# Patient Record
Sex: MALE | Race: WHITE | NOT HISPANIC OR LATINO
[De-identification: names, ages, dates, MRNs, and addresses within clinical notes are randomized per-mention and may not be internally consistent; named-entity substitution may affect disease eponyms.]

---

## 2018-03-07 ENCOUNTER — APPOINTMENT (OUTPATIENT)
Dept: UROLOGY | Facility: CLINIC | Age: 58
End: 2018-03-07
Payer: COMMERCIAL

## 2018-03-07 VITALS
HEART RATE: 53 BPM | SYSTOLIC BLOOD PRESSURE: 128 MMHG | DIASTOLIC BLOOD PRESSURE: 76 MMHG | WEIGHT: 160 LBS | BODY MASS INDEX: 23.7 KG/M2 | HEIGHT: 69 IN

## 2018-03-07 DIAGNOSIS — Z80.0 FAMILY HISTORY OF MALIGNANT NEOPLASM OF DIGESTIVE ORGANS: ICD-10-CM

## 2018-03-07 DIAGNOSIS — Z82.49 FAMILY HISTORY OF ISCHEMIC HEART DISEASE AND OTHER DISEASES OF THE CIRCULATORY SYSTEM: ICD-10-CM

## 2018-03-07 DIAGNOSIS — Z78.9 OTHER SPECIFIED HEALTH STATUS: ICD-10-CM

## 2018-03-07 DIAGNOSIS — I10 ESSENTIAL (PRIMARY) HYPERTENSION: ICD-10-CM

## 2018-03-07 LAB
BILIRUB UR QL STRIP: NORMAL
CLARITY UR: CLEAR
COLLECTION METHOD: NORMAL
GLUCOSE UR-MCNC: NORMAL
HCG UR QL: NORMAL EU/DL
HGB UR QL STRIP.AUTO: NORMAL
KETONES UR-MCNC: NORMAL
LEUKOCYTE ESTERASE UR QL STRIP: NORMAL
NITRITE UR QL STRIP: NORMAL
PH UR STRIP: 5
PROT UR STRIP-MCNC: NORMAL
SP GR UR STRIP: 1.02

## 2018-03-07 PROCEDURE — 81003 URINALYSIS AUTO W/O SCOPE: CPT | Mod: QW

## 2018-03-07 PROCEDURE — 99213 OFFICE O/P EST LOW 20 MIN: CPT | Mod: 25

## 2018-03-07 RX ORDER — FINASTERIDE 5 MG/1
5 TABLET, FILM COATED ORAL
Refills: 0 | Status: ACTIVE | COMMUNITY

## 2018-03-07 RX ORDER — ATENOLOL 25 MG/1
25 TABLET ORAL
Refills: 0 | Status: ACTIVE | COMMUNITY

## 2018-08-08 ENCOUNTER — MESSAGE (OUTPATIENT)
Age: 58
End: 2018-08-08

## 2018-11-13 ENCOUNTER — APPOINTMENT (OUTPATIENT)
Dept: UROLOGY | Facility: CLINIC | Age: 58
End: 2018-11-13
Payer: COMMERCIAL

## 2018-11-13 VITALS
HEART RATE: 58 BPM | HEIGHT: 69 IN | WEIGHT: 160 LBS | DIASTOLIC BLOOD PRESSURE: 72 MMHG | SYSTOLIC BLOOD PRESSURE: 135 MMHG | BODY MASS INDEX: 23.7 KG/M2

## 2018-11-13 DIAGNOSIS — N40.0 BENIGN PROSTATIC HYPERPLASIA WITHOUT LOWER URINARY TRACT SYMPMS: ICD-10-CM

## 2018-11-13 PROCEDURE — 99213 OFFICE O/P EST LOW 20 MIN: CPT

## 2018-12-11 ENCOUNTER — APPOINTMENT (OUTPATIENT)
Dept: UROLOGY | Facility: CLINIC | Age: 58
End: 2018-12-11
Payer: COMMERCIAL

## 2018-12-11 VITALS
SYSTOLIC BLOOD PRESSURE: 127 MMHG | DIASTOLIC BLOOD PRESSURE: 68 MMHG | HEART RATE: 56 BPM | WEIGHT: 160 LBS | BODY MASS INDEX: 23.7 KG/M2 | HEIGHT: 69 IN

## 2018-12-11 LAB
BILIRUB UR QL STRIP: NORMAL
CLARITY UR: CLEAR
COLLECTION METHOD: NORMAL
GLUCOSE UR-MCNC: NORMAL
HCG UR QL: NORMAL EU/DL
HGB UR QL STRIP.AUTO: NORMAL
KETONES UR-MCNC: NORMAL
LEUKOCYTE ESTERASE UR QL STRIP: NORMAL
NITRITE UR QL STRIP: NORMAL
PH UR STRIP: 5
PROT UR STRIP-MCNC: NORMAL
SP GR UR STRIP: 1.01

## 2018-12-11 PROCEDURE — 81003 URINALYSIS AUTO W/O SCOPE: CPT | Mod: QW

## 2018-12-11 PROCEDURE — 99213 OFFICE O/P EST LOW 20 MIN: CPT

## 2018-12-19 ENCOUNTER — OTHER (OUTPATIENT)
Age: 58
End: 2018-12-19

## 2019-01-04 ENCOUNTER — OUTPATIENT (OUTPATIENT)
Dept: OUTPATIENT SERVICES | Facility: HOSPITAL | Age: 59
LOS: 1 days | Discharge: HOME | End: 2019-01-04

## 2019-01-09 DIAGNOSIS — R97.20 ELEVATED PROSTATE SPECIFIC ANTIGEN [PSA]: ICD-10-CM

## 2019-02-06 ENCOUNTER — APPOINTMENT (OUTPATIENT)
Dept: UROLOGY | Facility: CLINIC | Age: 59
End: 2019-02-06
Payer: COMMERCIAL

## 2019-02-06 VITALS
DIASTOLIC BLOOD PRESSURE: 78 MMHG | HEART RATE: 72 BPM | BODY MASS INDEX: 24.44 KG/M2 | HEIGHT: 69 IN | WEIGHT: 165 LBS | SYSTOLIC BLOOD PRESSURE: 147 MMHG

## 2019-02-06 DIAGNOSIS — R97.20 ELEVATED PROSTATE, SPECIFIC ANTIGEN [PSA]: ICD-10-CM

## 2019-02-06 PROCEDURE — 99213 OFFICE O/P EST LOW 20 MIN: CPT

## 2019-02-06 NOTE — HISTORY OF PRESENT ILLNESS
[FreeTextEntry1] : 58-year-old with history of elevated PSA and BPH with 3 negative biopsies. 2 years ago had a negative MRI of the prostate. Last biopsy was a year ago when his PSA was 11 and was benign. He was started on finasteride and his PSA reduced to 6.3 and then increased to 9.9 and then on repeat was 8.1. Patient has been compliant with finasteride.\par MRI of the prostate was ordered which shows no significant lesions. He is a 53 cc prostate

## 2019-02-06 NOTE — ASSESSMENT
[FreeTextEntry1] : No significant lesions on MRI again. Multiple negative biopsies. We'll continue to monitor PSA

## 2019-02-24 ENCOUNTER — RX RENEWAL (OUTPATIENT)
Age: 59
End: 2019-02-24

## 2019-04-22 ENCOUNTER — RX RENEWAL (OUTPATIENT)
Age: 59
End: 2019-04-22

## 2019-05-10 ENCOUNTER — APPOINTMENT (OUTPATIENT)
Dept: UROLOGY | Facility: CLINIC | Age: 59
End: 2019-05-10
Payer: COMMERCIAL

## 2019-05-10 VITALS
BODY MASS INDEX: 24.44 KG/M2 | HEIGHT: 69 IN | DIASTOLIC BLOOD PRESSURE: 86 MMHG | WEIGHT: 165 LBS | HEART RATE: 70 BPM | SYSTOLIC BLOOD PRESSURE: 150 MMHG

## 2019-05-10 DIAGNOSIS — N52.9 MALE ERECTILE DYSFUNCTION, UNSPECIFIED: ICD-10-CM

## 2019-05-10 PROCEDURE — 99213 OFFICE O/P EST LOW 20 MIN: CPT

## 2019-05-10 NOTE — PHYSICAL EXAM
[General Appearance - In No Acute Distress] : no acute distress [No Prostate Nodules] : no prostate nodules [Prostate Tenderness] : the prostate was not tender [Costovertebral Angle Tenderness] : no ~M costovertebral angle tenderness [] : no respiratory distress [Prostate Size ___ (0-4)] : prostate size [unfilled] (scale: 0-4) [Normal Station and Gait] : the gait and station were normal for the patient's age [Oriented To Time, Place, And Person] : oriented to person, place, and time

## 2019-05-21 ENCOUNTER — RX RENEWAL (OUTPATIENT)
Age: 59
End: 2019-05-21

## 2019-05-23 RX ORDER — SILDENAFIL 100 MG/1
100 TABLET, FILM COATED ORAL
Qty: 6 | Refills: 5 | Status: ACTIVE | COMMUNITY
Start: 2019-05-10 | End: 1900-01-01

## 2019-06-13 ENCOUNTER — CHART COPY (OUTPATIENT)
Age: 59
End: 2019-06-13

## 2019-07-21 ENCOUNTER — RX RENEWAL (OUTPATIENT)
Age: 59
End: 2019-07-21

## 2019-09-10 ENCOUNTER — RX RENEWAL (OUTPATIENT)
Age: 59
End: 2019-09-10

## 2019-10-13 ENCOUNTER — RX RENEWAL (OUTPATIENT)
Age: 59
End: 2019-10-13

## 2019-11-19 ENCOUNTER — APPOINTMENT (OUTPATIENT)
Dept: UROLOGY | Facility: CLINIC | Age: 59
End: 2019-11-19
Payer: COMMERCIAL

## 2019-11-19 DIAGNOSIS — R97.20 ELEVATED PROSTATE, SPECIFIC ANTIGEN [PSA]: ICD-10-CM

## 2019-11-19 PROCEDURE — 99213 OFFICE O/P EST LOW 20 MIN: CPT

## 2019-11-19 NOTE — HISTORY OF PRESENT ILLNESS
[FreeTextEntry1] : 59-year-old with history of elevated PSA and BPH.He has had 3 benign biopsies of his prostate and an MRI 9 months ago showed no significant lesions. He is on finasteride 5 mg daily and states he has been compliant with that. Prior to finasteride PSA was 11 and is currently 8.3 compared to 8.1 9 months ago. There is no change in urinary frequency, urgency and urinary flow. There is no bone pain, no dysuria, no hematuria

## 2019-11-19 NOTE — PHYSICAL EXAM
[General Appearance - In No Acute Distress] : no acute distress [No Prostate Nodules] : no prostate nodules [Prostate Tenderness] : the prostate was not tender [Prostate Size ___ (0-4)] : prostate size [unfilled] (scale: 0-4) [] : no respiratory distress [Oriented To Time, Place, And Person] : oriented to person, place, and time [Normal Station and Gait] : the gait and station were normal for the patient's age

## 2019-11-19 NOTE — REVIEW OF SYSTEMS
[Shortness Of Breath] : no shortness of breath [Chest Pain] : no chest pain [Fever] : no fever [Confused] : no confusion [Dysuria] : no dysuria

## 2019-11-19 NOTE — HISTORY OF PRESENT ILLNESS
[FreeTextEntry1] : 58-year-old History of BPH and elevated PSA.He has had 3 negative biopsies. 2 years ago and 3 months ago had shown no significant lesions. Last biopsy  over one year ago was benign and his PSA was 11 and he was started on finasteride. PSA 3 months ago was 8.1 and for that the negative MRI was ordered.  New PSA is 7.4.His urinary frequency and urgency and flow but no worse than 3 months ago. There is no bone pain, no dysuria, no hematuria\par \par \par \par He also complains of some erectile dysfunction and occasionally unable to have intercourse as a result

## 2019-11-19 NOTE — ASSESSMENT
[FreeTextEntry1] : Although PSA has not corrected to the point you would expect when compared to pre-finasteride it is improved from 3 months ago. In light of multiple benign biopsies and benign MRIs will continue to monitor.\par \par Patient like to try Viagra for his erectile dysfunction responding to its alternatives fully discussed. He is on no nitrates

## 2019-11-19 NOTE — ASSESSMENT
[FreeTextEntry1] : Although PSA still is not 50% of pre-finasteride value, he has had 3 negative biopsies in the negative MRI 9 months ago. PSA is stable compared to 9 months ago although higher than 6 months ago. We'll continue to monitor

## 2019-12-12 ENCOUNTER — RX RENEWAL (OUTPATIENT)
Age: 59
End: 2019-12-12

## 2020-02-03 ENCOUNTER — RX RENEWAL (OUTPATIENT)
Age: 60
End: 2020-02-03

## 2020-03-09 ENCOUNTER — RX RENEWAL (OUTPATIENT)
Age: 60
End: 2020-03-09

## 2020-03-09 RX ORDER — TERAZOSIN 2 MG/1
2 CAPSULE ORAL
Qty: 90 | Refills: 3 | Status: ACTIVE | COMMUNITY
Start: 2018-06-12 | End: 1900-01-01

## 2020-05-19 ENCOUNTER — APPOINTMENT (OUTPATIENT)
Dept: UROLOGY | Facility: CLINIC | Age: 60
End: 2020-05-19
Payer: COMMERCIAL

## 2020-05-19 VITALS
TEMPERATURE: 95.9 F | BODY MASS INDEX: 24.44 KG/M2 | HEART RATE: 69 BPM | SYSTOLIC BLOOD PRESSURE: 143 MMHG | WEIGHT: 165 LBS | DIASTOLIC BLOOD PRESSURE: 74 MMHG | HEIGHT: 69 IN

## 2020-05-19 DIAGNOSIS — R97.20 ELEVATED PROSTATE, SPECIFIC ANTIGEN [PSA]: ICD-10-CM

## 2020-05-19 PROCEDURE — 99213 OFFICE O/P EST LOW 20 MIN: CPT

## 2020-05-19 NOTE — PHYSICAL EXAM
[General Appearance - Well Nourished] : well nourished [General Appearance - Well Developed] : well developed [Normal Appearance] : normal appearance [Well Groomed] : well groomed [General Appearance - In No Acute Distress] : no acute distress [Costovertebral Angle Tenderness] : no ~M costovertebral angle tenderness [Abdomen Soft] : soft [Abdomen Tenderness] : non-tender [Edema] : no peripheral edema [Respiration, Rhythm And Depth] : normal respiratory rhythm and effort [] : no respiratory distress [Exaggerated Use Of Accessory Muscles For Inspiration] : no accessory muscle use [Oriented To Time, Place, And Person] : oriented to person, place, and time [Affect] : the affect was normal [Mood] : the mood was normal [Not Anxious] : not anxious [Normal Station and Gait] : the gait and station were normal for the patient's age [No Focal Deficits] : no focal deficits

## 2020-05-19 NOTE — HISTORY OF PRESENT ILLNESS
[None] : no symptoms [FreeTextEntry1] : MARK BOYER is a 59 year old male with a past a medical history of elevated PSA and BPH. He has had 3 benign prostate biopsies and an MRI 1/2019 that showed no suspicious prostatic lesions. Currently on Finasteride 5 mg daily, prior to Finasteride PSA was 11 ng/ml. New PSA is 7.2 ng/ml, six months ago PSA was 8.3 ng/ml, and prior to that was 7.4 ng/ml. Voiding without any difficulty, satisfied with urinary condition. Denies bone pain, gross hematuria, dysuria, fever, or flank pain. \par

## 2020-05-19 NOTE — ASSESSMENT
[FreeTextEntry1] : PSA is stable at 7.2 ng/ml, lower than six months ago that wAS 8.3 ng/ml. He will continue Finasteride 5 mg daily, medication renewed. He will follow up in 6 months with a new PSA.

## 2020-09-16 ENCOUNTER — RX RENEWAL (OUTPATIENT)
Age: 60
End: 2020-09-16

## 2020-11-24 ENCOUNTER — APPOINTMENT (OUTPATIENT)
Dept: UROLOGY | Facility: CLINIC | Age: 60
End: 2020-11-24
Payer: COMMERCIAL

## 2020-11-24 VITALS
DIASTOLIC BLOOD PRESSURE: 70 MMHG | SYSTOLIC BLOOD PRESSURE: 121 MMHG | BODY MASS INDEX: 24.44 KG/M2 | WEIGHT: 165 LBS | HEIGHT: 69 IN | HEART RATE: 61 BPM | TEMPERATURE: 97.7 F

## 2020-11-24 PROCEDURE — 99213 OFFICE O/P EST LOW 20 MIN: CPT

## 2020-11-24 NOTE — HISTORY OF PRESENT ILLNESS
[FreeTextEntry1] : 60-year-old with history of BPH and lower urinary tract symptoms also elevated PSA. He has had 3 benign biopsies and a benign MRI of his prostate. He continues on finasteride 5 mg daily. PSA is 6.9 his improved. He is variable urinary stream with some slowing in the morning but normal throughout the day. It is No frequency urgency, no gross hematuria, no dysuria

## 2020-11-24 NOTE — REVIEW OF SYSTEMS
[Feeling Poorly] : not feeling poorly [Feeling Tired] : not feeling tired [Nasal Discharge] : no nasal discharge [Chest Pain] : no chest pain [Cough] : no cough [Constipation] : no constipation [Diarrhea] : no diarrhea [Dysuria] : no dysuria

## 2021-03-01 ENCOUNTER — RX RENEWAL (OUTPATIENT)
Age: 61
End: 2021-03-01

## 2021-05-25 ENCOUNTER — APPOINTMENT (OUTPATIENT)
Dept: UROLOGY | Facility: CLINIC | Age: 61
End: 2021-05-25
Payer: COMMERCIAL

## 2021-05-25 DIAGNOSIS — R97.20 ELEVATED PROSTATE, SPECIFIC ANTIGEN [PSA]: ICD-10-CM

## 2021-05-25 PROCEDURE — 99213 OFFICE O/P EST LOW 20 MIN: CPT

## 2021-05-25 NOTE — REVIEW OF SYSTEMS
[Feeling Poorly] : not feeling poorly [Feeling Tired] : not feeling tired [Nasal Discharge] : no nasal discharge [Chest Pain] : no chest pain [Cough] : no cough [Constipation] : no constipation [Dysuria] : no dysuria [Confused] : no confusion

## 2021-05-25 NOTE — ASSESSMENT
[Urinary Symptom or Sign (788.99\R39.89)] : implantation [FreeTextEntry1] : Stable BPH. PSA is within his fluctuating range. Some concern that has not gone to 50% of what was prior to finasteride and will continue to be monitored.

## 2021-05-25 NOTE — HISTORY OF PRESENT ILLNESS
[FreeTextEntry1] : 60-year-old with history of elevated PSA, BPH, 3 benign biopsy, benign MRI, 53 cc prostate, currently on finasteride. Prior to fenestrate PSA was 11.  PSA has fluctuated between 6.9 and 8.3 on finasteride  currently 7.8. He states he is compliant with the medication.No change in urination.He continues to have nocturia x0-1, variable flow, no frequency urgency, no dysuria, no hematuria [Urinary Incontinence] : no urinary incontinence [Urinary Retention] : no urinary retention [Straining] : no straining [Dysuria] : no dysuria [Hematuria - Gross] : no gross hematuria

## 2021-09-13 ENCOUNTER — RX RENEWAL (OUTPATIENT)
Age: 61
End: 2021-09-13

## 2021-09-13 RX ORDER — TERAZOSIN 2 MG/1
2 CAPSULE ORAL
Qty: 90 | Refills: 3 | Status: ACTIVE | COMMUNITY
Start: 2019-09-10 | End: 1900-01-01

## 2021-11-23 ENCOUNTER — APPOINTMENT (OUTPATIENT)
Dept: UROLOGY | Facility: CLINIC | Age: 61
End: 2021-11-23
Payer: COMMERCIAL

## 2021-11-23 PROCEDURE — 99213 OFFICE O/P EST LOW 20 MIN: CPT

## 2021-11-23 NOTE — HISTORY OF PRESENT ILLNESS
[FreeTextEntry1] : 61-year-old with history of elevated PSA, BPH, 3 benign biopsies, 2 benign MRIs.  Last MRI showed no suspicious lesions 2 years ago.  Prostate was 53 cc.  He continues on finasteride 5 mg daily.  Is also on Delgadillo and 2 mg nightly.  Current PSA is 7.5 improved from last time.  He fluctuates between 6.9 and 8.3 while on finasteride.  PSA prior to finasteride was 11.\par \par He has good urinary flow.  Nocturia x0-1.  No frequency urgency.

## 2021-11-23 NOTE — ASSESSMENT
[FreeTextEntry1] : Patient with elevated PSA on finasteride with multiple benign biopsies and 2 benign MRIs.  PSA remains stable though slightly higher than would like on a patient on finasteride.  Since it is stable we will continue to monitor and recheck in 6 months

## 2021-11-23 NOTE — REVIEW OF SYSTEMS
[Feeling Poorly] : not feeling poorly [Sore Throat] : no sore throat [Chest Pain] : no chest pain [Shortness Of Breath] : no shortness of breath [Cough] : no cough [Constipation] : no constipation [Diarrhea] : no diarrhea [Confused] : no confusion

## 2022-05-20 ENCOUNTER — RX RENEWAL (OUTPATIENT)
Age: 62
End: 2022-05-20

## 2022-06-01 ENCOUNTER — TRANSCRIPTION ENCOUNTER (OUTPATIENT)
Age: 62
End: 2022-06-01

## 2022-06-01 ENCOUNTER — APPOINTMENT (OUTPATIENT)
Dept: UROLOGY | Facility: CLINIC | Age: 62
End: 2022-06-01
Payer: COMMERCIAL

## 2022-06-01 VITALS
SYSTOLIC BLOOD PRESSURE: 124 MMHG | HEART RATE: 61 BPM | HEIGHT: 69 IN | WEIGHT: 160 LBS | DIASTOLIC BLOOD PRESSURE: 73 MMHG | BODY MASS INDEX: 23.7 KG/M2

## 2022-06-01 PROCEDURE — 99214 OFFICE O/P EST MOD 30 MIN: CPT

## 2022-06-01 RX ORDER — TERAZOSIN 2 MG/1
2 CAPSULE ORAL
Qty: 90 | Refills: 3 | Status: ACTIVE | COMMUNITY
Start: 2022-05-20 | End: 1900-01-01

## 2022-06-01 RX ORDER — FINASTERIDE 5 MG/1
5 TABLET, FILM COATED ORAL DAILY
Qty: 90 | Refills: 3 | Status: ACTIVE | COMMUNITY
Start: 2022-06-01 | End: 1900-01-01

## 2022-06-01 RX ORDER — FINASTERIDE 5 MG/1
5 TABLET, FILM COATED ORAL
Qty: 90 | Refills: 3 | Status: ACTIVE | COMMUNITY
Start: 2018-08-08 | End: 1900-01-01

## 2022-06-29 ENCOUNTER — RESULT REVIEW (OUTPATIENT)
Age: 62
End: 2022-06-29

## 2022-06-29 ENCOUNTER — OUTPATIENT (OUTPATIENT)
Dept: OUTPATIENT SERVICES | Facility: HOSPITAL | Age: 62
LOS: 1 days | Discharge: HOME | End: 2022-06-29

## 2022-06-29 DIAGNOSIS — R97.20 ELEVATED PROSTATE SPECIFIC ANTIGEN [PSA]: ICD-10-CM

## 2022-06-29 PROCEDURE — 72197 MRI PELVIS W/O & W/DYE: CPT | Mod: 26

## 2022-07-06 ENCOUNTER — APPOINTMENT (OUTPATIENT)
Dept: UROLOGY | Facility: CLINIC | Age: 62
End: 2022-07-06

## 2022-07-06 VITALS — WEIGHT: 160 LBS | BODY MASS INDEX: 23.7 KG/M2 | HEIGHT: 69 IN

## 2022-07-06 DIAGNOSIS — R39.9 UNSPECIFIED SYMPTOMS AND SIGNS INVOLVING THE GENITOURINARY SYSTEM: ICD-10-CM

## 2022-07-06 DIAGNOSIS — R97.20 ELEVATED PROSTATE, SPECIFIC ANTIGEN [PSA]: ICD-10-CM

## 2022-07-06 DIAGNOSIS — Z00.00 ENCOUNTER FOR GENERAL ADULT MEDICAL EXAMINATION W/OUT ABNORMAL FINDINGS: ICD-10-CM

## 2022-07-06 LAB
BILIRUB UR QL STRIP: NORMAL
COLLECTION METHOD: NORMAL
GLUCOSE UR-MCNC: NORMAL
HCG UR QL: 0.2 EU/DL
HGB UR QL STRIP.AUTO: NORMAL
KETONES UR-MCNC: NORMAL
LEUKOCYTE ESTERASE UR QL STRIP: NORMAL
NITRITE UR QL STRIP: NORMAL
PH UR STRIP: 6
PROT UR STRIP-MCNC: NORMAL
SP GR UR STRIP: 1.02

## 2022-07-06 PROCEDURE — 99214 OFFICE O/P EST MOD 30 MIN: CPT

## 2022-07-06 PROCEDURE — 81003 URINALYSIS AUTO W/O SCOPE: CPT | Mod: QW

## 2022-07-06 NOTE — ASSESSMENT
[Urinary Symptom or Sign (788.99\R39.89)] : implantation [FreeTextEntry1] : No suspicious lesion on MRI however PSA density is of concern and I recommend patient undergo a transperineal biopsy of the prostate.  Risk benefits alternatives fully discussed including continued observation, risks of transperineal biopsy including infection, bleeding, pain, urinary retention.  Procedure my hands will be done under general anesthesia.  Alternative to local anesthesia discussed.  Patient is going for colonoscopy near future and prefers to repeat PSA in 3 months and if still elevated above baseline then he will agree to proceed.  He understands risk of this delay

## 2022-07-06 NOTE — HISTORY OF PRESENT ILLNESS
[FreeTextEntry1] : 61-year-old with history of elevated PSA BPH and 3 benign prostate biopsy and previously 2 benign MRIs.  He continues on finasteride 5 mg daily and terazosin 2 mg daily for lower urinary tract symptoms which are well controlled with good urinary flow and nocturia x0-1.  No urinary frequency.\par \par Patient went for repeat PSA which is 9.0 which is continued to be above his baseline on finasteride.  Creatinine 1.0.  He went for MRI of his prostate which showed a 58 cc prostate with no suspicious lesions.  His PSA density is 0.155

## 2022-10-07 ENCOUNTER — APPOINTMENT (OUTPATIENT)
Dept: UROLOGY | Facility: CLINIC | Age: 62
End: 2022-10-07

## 2022-10-07 PROCEDURE — 99213 OFFICE O/P EST LOW 20 MIN: CPT

## 2022-12-21 ENCOUNTER — APPOINTMENT (OUTPATIENT)
Dept: UROLOGY | Facility: CLINIC | Age: 62
End: 2022-12-21

## 2022-12-21 VITALS
SYSTOLIC BLOOD PRESSURE: 120 MMHG | DIASTOLIC BLOOD PRESSURE: 80 MMHG | HEIGHT: 69 IN | BODY MASS INDEX: 23.7 KG/M2 | WEIGHT: 160 LBS

## 2022-12-21 DIAGNOSIS — N13.8 BENIGN PROSTATIC HYPERPLASIA WITH LOWER URINARY TRACT SYMPMS: ICD-10-CM

## 2022-12-21 DIAGNOSIS — N40.1 BENIGN PROSTATIC HYPERPLASIA WITH LOWER URINARY TRACT SYMPMS: ICD-10-CM

## 2022-12-21 DIAGNOSIS — R97.20 ELEVATED PROSTATE, SPECIFIC ANTIGEN [PSA]: ICD-10-CM

## 2022-12-21 PROCEDURE — 99213 OFFICE O/P EST LOW 20 MIN: CPT

## 2022-12-21 NOTE — PHYSICAL EXAM
[] : no respiratory distress [Oriented To Time, Place, And Person] : oriented to person, place, and time [Normal Station and Gait] : the gait and station were normal for the patient's age

## 2022-12-21 NOTE — ASSESSMENT
[FreeTextEntry1] : Patient continues to have elevated PSA and has not reduced appropriately for patient on finasteride.  His PSA density now is 0.155 elevated.  Despite negative MRI for suspicious lesion I recommend undergoing transperineal biopsy prostate.  He is moving to [place] and would like to pursue this there.  Records offered

## 2022-12-21 NOTE — HISTORY OF PRESENT ILLNESS
[FreeTextEntry1] : 62-year-old with history of elevated PSA, BPH, history of 3 prostate biopsies benign last in 2017 when his PSA was 11 prior to finasteride.  Post finasteride PSA decreased to 7.8 in May 2021 increased to 9/2022.  MRI in 2022 showed no suspicious lesion in the prostate and a 58 cc volume.  His PSA density in September 2022 with PSA of 7.9 was 0.13.  Currently PSA has increased to 9.0 and his PSA density is now 0.155.  No change in urinary symptoms and he continues on finasteride 5 mg daily and Delgadillo and 2 mg daily.  He has good urinary flow, nocturia x0-1, no urinary frequency.